# Patient Record
Sex: FEMALE | Race: OTHER
[De-identification: names, ages, dates, MRNs, and addresses within clinical notes are randomized per-mention and may not be internally consistent; named-entity substitution may affect disease eponyms.]

---

## 2019-06-24 ENCOUNTER — HOSPITAL ENCOUNTER (OUTPATIENT)
Dept: HOSPITAL 53 - M WUC | Age: 29
End: 2019-06-24
Attending: PHYSICIAN ASSISTANT
Payer: COMMERCIAL

## 2019-06-24 DIAGNOSIS — M25.531: Primary | ICD-10-CM

## 2019-06-24 NOTE — REP
Clinical:  Right wrist pain

 

Technique:  AP, lateral, bilateral oblique views right wrist .

 

Findings:  The carpal bones, surrounding osseous structures, soft tissues, and

joint spaces are normal.  There is no evidence for acute fracture or dislocation.

No subcutaneous emphysema or radiodense foreign body.

 

Impression:

Normal age-appropriate right wrist series.  No acute fracture or dislocation

 

 

Electronically Signed by

Bakari Brunner MD 06/24/2019 05:08 P

## 2020-06-06 ENCOUNTER — HOSPITAL ENCOUNTER (OUTPATIENT)
Dept: HOSPITAL 53 - M LABSMTC | Age: 30
End: 2020-06-06
Attending: ANESTHESIOLOGY
Payer: COMMERCIAL

## 2020-06-06 DIAGNOSIS — Z11.59: ICD-10-CM

## 2020-06-06 DIAGNOSIS — Z01.818: Primary | ICD-10-CM

## 2020-06-09 ENCOUNTER — HOSPITAL ENCOUNTER (OUTPATIENT)
Dept: HOSPITAL 53 - M SDC | Age: 30
Discharge: HOME | End: 2020-06-09
Attending: OBSTETRICS & GYNECOLOGY
Payer: COMMERCIAL

## 2020-06-09 VITALS — WEIGHT: 159 LBS | BODY MASS INDEX: 27.14 KG/M2 | HEIGHT: 64 IN

## 2020-06-09 VITALS — DIASTOLIC BLOOD PRESSURE: 87 MMHG | SYSTOLIC BLOOD PRESSURE: 135 MMHG

## 2020-06-09 DIAGNOSIS — Z79.899: ICD-10-CM

## 2020-06-09 DIAGNOSIS — N84.0: Primary | ICD-10-CM

## 2020-06-09 LAB
HCT VFR BLD AUTO: 38.4 % (ref 36–47)
HGB BLD-MCNC: 11.9 G/DL (ref 12–15.5)
MCH RBC QN AUTO: 26.4 PG (ref 27–33)
MCHC RBC AUTO-ENTMCNC: 31 G/DL (ref 32–36.5)
MCV RBC AUTO: 85.3 FL (ref 80–96)
PLATELET # BLD AUTO: 275 10^3/UL (ref 150–450)
RBC # BLD AUTO: 4.5 10^6/UL (ref 4–5.4)
WBC # BLD AUTO: 5.6 10^3/UL (ref 4–10)

## 2020-06-09 PROCEDURE — 36415 COLL VENOUS BLD VENIPUNCTURE: CPT

## 2020-06-09 PROCEDURE — 58558 HYSTEROSCOPY BIOPSY: CPT

## 2020-06-09 PROCEDURE — 81025 URINE PREGNANCY TEST: CPT

## 2020-06-09 PROCEDURE — 88305 TISSUE EXAM BY PATHOLOGIST: CPT

## 2020-06-09 PROCEDURE — 85027 COMPLETE CBC AUTOMATED: CPT

## 2020-10-06 ENCOUNTER — HOSPITAL ENCOUNTER (OUTPATIENT)
Dept: HOSPITAL 53 - M LAB | Age: 30
End: 2020-10-06
Payer: COMMERCIAL

## 2020-10-06 DIAGNOSIS — Z32.00: Primary | ICD-10-CM

## 2020-11-02 ENCOUNTER — HOSPITAL ENCOUNTER (OUTPATIENT)
Dept: HOSPITAL 53 - M LAB | Age: 30
End: 2020-11-02
Payer: COMMERCIAL

## 2020-11-02 DIAGNOSIS — Z31.49: Primary | ICD-10-CM

## 2020-11-02 LAB — EST. AVERAGE GLUCOSE BLD GHB EST-MCNC: 100 MG/DL (ref 60–110)

## 2020-12-28 ENCOUNTER — HOSPITAL ENCOUNTER (OUTPATIENT)
Dept: HOSPITAL 53 - M LAB | Age: 30
End: 2020-12-28
Attending: OBSTETRICS & GYNECOLOGY
Payer: COMMERCIAL

## 2020-12-28 DIAGNOSIS — Z32.00: Primary | ICD-10-CM

## 2020-12-28 LAB
B-HCG SERPL-ACNC: 267 MIU/ML
PROGEST SERPL-MCNC: 23.12 NG/ML

## 2020-12-30 ENCOUNTER — HOSPITAL ENCOUNTER (OUTPATIENT)
Dept: HOSPITAL 53 - M LAB | Age: 30
End: 2020-12-30
Payer: COMMERCIAL

## 2020-12-30 DIAGNOSIS — O09.00: Primary | ICD-10-CM

## 2020-12-30 DIAGNOSIS — Z3A.00: ICD-10-CM

## 2021-02-05 ENCOUNTER — HOSPITAL ENCOUNTER (OUTPATIENT)
Dept: HOSPITAL 53 - M PLALAB | Age: 31
End: 2021-02-05
Attending: OBSTETRICS & GYNECOLOGY
Payer: COMMERCIAL

## 2021-02-05 DIAGNOSIS — Z34.01: Primary | ICD-10-CM

## 2021-02-05 LAB
CHLAMYDIA DNA AMPLIFICATION: NEGATIVE
HCT VFR BLD AUTO: 38.9 % (ref 36–47)
HCV AB SER QL: 0.1 INDEX (ref ?–0.8)
HGB BLD-MCNC: 12.3 G/DL (ref 12–15.5)
HIV 1+2 AB+HIV1 P24 AG SERPL QL IA: NEGATIVE
MCH RBC QN AUTO: 26.6 PG (ref 27–33)
MCHC RBC AUTO-ENTMCNC: 31.6 G/DL (ref 32–36.5)
MCV RBC AUTO: 84 FL (ref 80–96)
N GONORRHOEA RRNA SPEC QL NAA+PROBE: NEGATIVE
PLATELET # BLD AUTO: 289 10^3/UL (ref 150–450)
RBC # BLD AUTO: 4.63 10^6/UL (ref 4–5.4)
WBC # BLD AUTO: 10.2 10^3/UL (ref 4–10)

## 2021-02-19 ENCOUNTER — HOSPITAL ENCOUNTER (OUTPATIENT)
Dept: HOSPITAL 53 - M PLALAB | Age: 31
End: 2021-02-19
Attending: OBSTETRICS & GYNECOLOGY
Payer: COMMERCIAL

## 2021-02-19 DIAGNOSIS — Z3A.00: ICD-10-CM

## 2021-02-19 DIAGNOSIS — Z34.81: Primary | ICD-10-CM

## 2021-04-12 ENCOUNTER — HOSPITAL ENCOUNTER (OUTPATIENT)
Dept: HOSPITAL 53 - M WHC | Age: 31
End: 2021-04-12
Attending: OBSTETRICS & GYNECOLOGY
Payer: COMMERCIAL

## 2021-04-12 DIAGNOSIS — Z36.89: Primary | ICD-10-CM

## 2021-04-12 DIAGNOSIS — Z3A.19: ICD-10-CM

## 2021-04-13 NOTE — REP
INDICATION:

ANATOMY



COMPARISON:

None.



TECHNIQUE:

Transabdominal obstetrical ultrasound with color Doppler evaluation.



FINDINGS:

Examination demonstrates a single live intrauterine pregnancy in cephalic

presentation.  Fetal motion is identified by technologist.  Placenta is noted

posterior grade 0 and  grade without evidence for placenta previa or abruption.

Amniotic fluid volume is normal.  Cervix measures 3.1 cm in length and appears closed.

Marginal insertion of the cord on placenta noted..



Gestational age by LMP 19 weeks 2 days with ALICE 09/04/2021.

Gestational age by current measurements 19 weeks 0 days with ALICE 09/06/2021.



FHR equals 155 beats per minute.



BPD:      4.2 cm at        18 weeks 6 days

HC:         15.7 cm at         18 weeks 4 days

AC:         13.6 cm at      19 weeks 1 day

FL:          3.0 cm at      19 weeks 1 day

HL:          2.9 cm at      19 weeks 2 days

HC/AC: 1.15

Estimated fetal weight 272 grams (34thpercentile).



Anatomical assessment demonstrates normal structures including cranium, choroid

plexus, cavum, cerebellum/posterior fossa, facial features, lungs, four-chamber

heart/ventricular outflow tracts, diaphragm, stomach, cord insertion/three-vessel

cord, kidneys/bladder, spine, and extremities.



IMPRESSION:

Single live intrauterine pregnancy in cephalic presentation demonstrating appropriate

estimated fetal weight.

Anatomical assessment is complete and normal.





<Electronically signed by Bakari Brunner > 04/13/21 0728

## 2021-06-11 ENCOUNTER — HOSPITAL ENCOUNTER (OUTPATIENT)
Dept: HOSPITAL 53 - M PLALAB | Age: 31
End: 2021-06-11
Attending: OBSTETRICS & GYNECOLOGY
Payer: COMMERCIAL

## 2021-06-11 DIAGNOSIS — Z36.89: ICD-10-CM

## 2021-06-11 DIAGNOSIS — Z34.02: Primary | ICD-10-CM

## 2021-06-11 LAB
HCT VFR BLD AUTO: 38.2 % (ref 36–47)
HGB BLD-MCNC: 12.1 G/DL (ref 12–15.5)
MCH RBC QN AUTO: 27.6 PG (ref 27–33)
MCHC RBC AUTO-ENTMCNC: 31.7 G/DL (ref 32–36.5)
MCV RBC AUTO: 87 FL (ref 80–96)
PLATELET # BLD AUTO: 218 10^3/UL (ref 150–450)
RBC # BLD AUTO: 4.39 10^6/UL (ref 4–5.4)
WBC # BLD AUTO: 11 10^3/UL (ref 4–10)

## 2021-08-04 ENCOUNTER — HOSPITAL ENCOUNTER (OUTPATIENT)
Dept: HOSPITAL 53 - M PLALAB | Age: 31
End: 2021-08-04
Attending: OBSTETRICS & GYNECOLOGY
Payer: COMMERCIAL

## 2021-08-04 DIAGNOSIS — Z3A.35: ICD-10-CM

## 2021-08-04 DIAGNOSIS — Z34.93: Primary | ICD-10-CM

## 2021-08-24 ENCOUNTER — HOSPITAL ENCOUNTER (OUTPATIENT)
Dept: HOSPITAL 53 - M RAD | Age: 31
End: 2021-08-24
Attending: OBSTETRICS & GYNECOLOGY
Payer: COMMERCIAL

## 2021-08-24 DIAGNOSIS — Z36.2: ICD-10-CM

## 2021-08-24 DIAGNOSIS — Z34.03: Primary | ICD-10-CM

## 2021-08-24 DIAGNOSIS — Z3A.37: ICD-10-CM

## 2021-08-24 NOTE — REP
INDICATION:

3RD TRIMESTER US.



COMPARISON:

04/12/2021



TECHNIQUE:

Transabdominal scanning



FINDINGS:

Multiple ultrasonographic images of the gravid uterus shows a single living

intrauterine gestation in the cephalic presentation.  Doppler interrogation of the

fetal heart shows a heart rate of 124 beats per minute.  Secondary to the low position

of the fetal head inaccurate cervical length measurement could not be obtained.  The

placenta is posterior left lateral and not low lying.  The subjective amniotic fluid

volume is within normal limits.  The calculated amniotic fluid index is 11.5 with an

expected range 7.3 to 23.7. Doppler interrogation of the umbilical artery shows an

A\B ratio of 1.80.  This is within the normal range.



BPD: 9.5 cm 38 weeks 6 days

HC: 33.7 cm 38 weeks 5 days

AC: 33.9 cm 37 weeks 6 days

FL: 7.0 cm 35 weeks 6 days

The estimated fetal weight is 3249 g which is at the 64th percentile for 38 week 1 day

gestational age.



IMPRESSION:

Limited OB ultrasound, as described above, showing a single living intra uterine

gestation with an estimated gestational age of 37 weeks 5 days via composite criteria

and an estimated date of delivery of 09/09/2021 by today's exam.





<Electronically signed by Rasta Nj > 08/24/21 [id number]

## 2021-12-01 ENCOUNTER — HOSPITAL ENCOUNTER (OUTPATIENT)
Dept: HOSPITAL 53 - M LABSMTC | Age: 31
End: 2021-12-01
Attending: FAMILY MEDICINE

## 2021-12-01 DIAGNOSIS — Z20.822: Primary | ICD-10-CM

## 2021-12-03 ENCOUNTER — HOSPITAL ENCOUNTER (OUTPATIENT)
Dept: HOSPITAL 53 - M EMP | Age: 31
End: 2021-12-03
Attending: FAMILY MEDICINE

## 2021-12-03 DIAGNOSIS — Z20.828: Primary | ICD-10-CM

## 2021-12-03 LAB
FLUAV RNA UPPER RESP QL NAA+PROBE: POSITIVE
FLUBV RNA UPPER RESP QL NAA+PROBE: POSITIVE

## 2022-03-01 ENCOUNTER — HOSPITAL ENCOUNTER (OUTPATIENT)
Dept: HOSPITAL 53 - M EMP | Age: 32
End: 2022-03-01
Attending: FAMILY MEDICINE

## 2022-03-01 DIAGNOSIS — Z20.822: Primary | ICD-10-CM

## 2022-08-23 ENCOUNTER — HOSPITAL ENCOUNTER (OUTPATIENT)
Dept: HOSPITAL 53 - M PLALAB | Age: 32
End: 2022-08-23
Attending: OBSTETRICS & GYNECOLOGY
Payer: COMMERCIAL

## 2022-08-23 DIAGNOSIS — Z12.4: Primary | ICD-10-CM

## 2022-08-23 PROCEDURE — 87624 HPV HI-RISK TYP POOLED RSLT: CPT

## 2022-10-10 ENCOUNTER — HOSPITAL ENCOUNTER (OUTPATIENT)
Dept: HOSPITAL 53 - M LABSMTC | Age: 32
End: 2022-10-10
Attending: FAMILY MEDICINE

## 2022-10-10 DIAGNOSIS — Z20.822: Primary | ICD-10-CM

## 2023-04-21 ENCOUNTER — HOSPITAL ENCOUNTER (OUTPATIENT)
Dept: HOSPITAL 53 - M PLALAB | Age: 33
End: 2023-04-21
Attending: PHYSICIAN ASSISTANT
Payer: COMMERCIAL

## 2023-04-21 DIAGNOSIS — Z00.00: Primary | ICD-10-CM

## 2023-04-21 DIAGNOSIS — Z13.220: ICD-10-CM

## 2023-04-21 LAB
25(OH)D3 SERPL-MCNC: 27.5 NG/ML (ref 20–100)
ALBUMIN SERPL BCG-MCNC: 3.9 G/DL (ref 3.2–5.2)
ALP SERPL-CCNC: 55 U/L (ref 46–116)
ALT SERPL W P-5'-P-CCNC: 18 U/L (ref 7–40)
AST SERPL-CCNC: 14 U/L (ref ?–34)
BASOPHILS # BLD AUTO: 0 10^3/UL (ref 0–0.2)
BASOPHILS NFR BLD AUTO: 0.4 % (ref 0–1)
BILIRUB SERPL-MCNC: 0.5 MG/DL (ref 0.3–1.2)
BUN SERPL-MCNC: 8 MG/DL (ref 9–23)
CALCIUM SERPL-MCNC: 9.4 MG/DL (ref 8.5–10.1)
CHLORIDE SERPL-SCNC: 107 MMOL/L (ref 98–107)
CHOLEST SERPL-MCNC: 163 MG/DL (ref ?–200)
CHOLEST/HDLC SERPL: 3.11 {RATIO} (ref ?–5)
CO2 SERPL-SCNC: 26 MMOL/L (ref 20–31)
CREAT SERPL-MCNC: 0.73 MG/DL (ref 0.55–1.3)
EOSINOPHIL # BLD AUTO: 0.2 10^3/UL (ref 0–0.5)
EOSINOPHIL NFR BLD AUTO: 2.9 % (ref 0–3)
EST. AVERAGE GLUCOSE BLD GHB EST-MCNC: 111 MG/DL (ref 60–110)
GFR SERPL CREATININE-BSD FRML MDRD: > 60 ML/MIN/{1.73_M2} (ref 60–?)
GLUCOSE SERPL-MCNC: 107 MG/DL (ref 60–100)
HCT VFR BLD AUTO: 39.1 % (ref 36–47)
HDLC SERPL-MCNC: 52.3 MG/DL (ref 40–?)
HGB BLD-MCNC: 12 G/DL (ref 12–15.5)
LDLC SERPL CALC-MCNC: 76.1 MG/DL (ref ?–100)
LYMPHOCYTES # BLD AUTO: 3 10^3/UL (ref 1.5–5)
LYMPHOCYTES NFR BLD AUTO: 41.4 % (ref 24–44)
MCH RBC QN AUTO: 25.8 PG (ref 27–33)
MCHC RBC AUTO-ENTMCNC: 30.7 G/DL (ref 32–36.5)
MCV RBC AUTO: 84.1 FL (ref 80–96)
MONOCYTES # BLD AUTO: 0.3 10^3/UL (ref 0–0.8)
MONOCYTES NFR BLD AUTO: 4.6 % (ref 2–8)
NEUTROPHILS # BLD AUTO: 3.6 10^3/UL (ref 1.5–8.5)
NEUTROPHILS NFR BLD AUTO: 50.4 % (ref 36–66)
NONHDLC SERPL-MCNC: 110.7 MG/DL
PLATELET # BLD AUTO: 279 10^3/UL (ref 150–450)
POTASSIUM SERPL-SCNC: 4.7 MMOL/L (ref 3.5–5.1)
PROT SERPL-MCNC: 7.2 G/DL (ref 5.7–8.2)
RBC # BLD AUTO: 4.65 10^6/UL (ref 4–5.4)
SODIUM SERPL-SCNC: 141 MMOL/L (ref 136–145)
T4 FREE SERPL-MCNC: 1.01 NG/DL (ref 0.89–1.76)
TRIGL SERPL-MCNC: 173 MG/DL (ref ?–150)
TSH SERPL DL<=0.005 MIU/L-ACNC: 3.12 UIU/ML (ref 0.55–4.78)
WBC # BLD AUTO: 7.2 10^3/UL (ref 4–10)

## 2024-04-03 ENCOUNTER — HOSPITAL ENCOUNTER (OUTPATIENT)
Dept: HOSPITAL 53 - M SDC | Age: 34
Discharge: HOME | End: 2024-04-03
Attending: ORTHOPAEDIC SURGERY
Payer: COMMERCIAL

## 2024-04-03 VITALS — SYSTOLIC BLOOD PRESSURE: 121 MMHG | OXYGEN SATURATION: 100 % | TEMPERATURE: 97.4 F | DIASTOLIC BLOOD PRESSURE: 72 MMHG

## 2024-04-03 VITALS — BODY MASS INDEX: 29.53 KG/M2 | WEIGHT: 173 LBS | HEIGHT: 64 IN

## 2024-04-03 DIAGNOSIS — G56.03: Primary | ICD-10-CM

## 2024-04-03 LAB — B-HCG SERPL QL: NEGATIVE

## 2024-04-03 PROCEDURE — 29848 WRIST ENDOSCOPY/SURGERY: CPT

## 2024-04-03 PROCEDURE — 36415 COLL VENOUS BLD VENIPUNCTURE: CPT

## 2024-04-03 PROCEDURE — 84703 CHORIONIC GONADOTROPIN ASSAY: CPT
